# Patient Record
Sex: FEMALE | Race: WHITE | Employment: FULL TIME | URBAN - METROPOLITAN AREA
[De-identification: names, ages, dates, MRNs, and addresses within clinical notes are randomized per-mention and may not be internally consistent; named-entity substitution may affect disease eponyms.]

---

## 2020-10-14 ENCOUNTER — HOSPITAL ENCOUNTER (OUTPATIENT)
Dept: MAMMOGRAPHY | Age: 55
Discharge: HOME | End: 2020-10-19
Payer: MEDICAID

## 2020-10-14 PROCEDURE — 77063 BREAST TOMOSYNTHESIS BI: CPT

## 2020-10-26 ENCOUNTER — HOSPITAL ENCOUNTER (OUTPATIENT)
Dept: ULTRASOUND IMAGING | Age: 55
Discharge: HOME | End: 2020-10-26
Payer: MEDICAID

## 2020-10-26 PROCEDURE — 76642 ULTRASOUND BREAST LIMITED: CPT

## 2022-01-06 ENCOUNTER — HOSPITAL ENCOUNTER (OUTPATIENT)
Dept: NURSING | Age: 57
Setting detail: INFUSION SERIES
Discharge: HOME | End: 2022-01-06
Payer: MEDICAID

## 2022-01-06 VITALS
SYSTOLIC BLOOD PRESSURE: 133 MMHG | OXYGEN SATURATION: 96 % | DIASTOLIC BLOOD PRESSURE: 102 MMHG | RESPIRATION RATE: 18 BRPM | HEART RATE: 103 BPM | TEMPERATURE: 97.5 F

## 2022-01-06 PROCEDURE — 6360000002 HC RX W HCPCS: Performed by: FAMILY MEDICINE

## 2022-01-06 PROCEDURE — M0245 HC IV INFUSION BAMLANIVIMAB & ETESEVIMAB W/MONITORING: HCPCS

## 2022-01-06 PROCEDURE — 99203 OFFICE O/P NEW LOW 30 MIN: CPT

## 2022-01-06 PROCEDURE — 2500000003 HC RX 250 WO HCPCS: Performed by: FAMILY MEDICINE

## 2022-01-06 PROCEDURE — 2580000003 HC RX 258: Performed by: FAMILY MEDICINE

## 2022-01-06 RX ADMIN — SODIUM CHLORIDE: 9 INJECTION, SOLUTION INTRAVENOUS at 13:49

## 2022-01-06 NOTE — PROGRESS NOTES
COVID infusion complete. Pt is A/O,VSS. No s/s of distress. Discharge instructions given. Pt verbalized understanding denies any questions/ needs at this time.  Pt ambulatory to car for discharge to home

## 2023-05-19 ENCOUNTER — HOSPITAL ENCOUNTER (OUTPATIENT)
Dept: MAMMOGRAPHY | Age: 58
End: 2023-05-19
Payer: MEDICAID

## 2023-05-19 VITALS — BODY MASS INDEX: 33.13 KG/M2 | WEIGHT: 180 LBS | HEIGHT: 62 IN

## 2023-05-19 DIAGNOSIS — Z12.39 SCREENING BREAST EXAMINATION: ICD-10-CM

## 2023-05-19 PROCEDURE — 77063 BREAST TOMOSYNTHESIS BI: CPT
